# Patient Record
Sex: MALE | Race: BLACK OR AFRICAN AMERICAN | NOT HISPANIC OR LATINO
[De-identification: names, ages, dates, MRNs, and addresses within clinical notes are randomized per-mention and may not be internally consistent; named-entity substitution may affect disease eponyms.]

---
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
## 2023-07-07 ENCOUNTER — HOSPITAL ENCOUNTER (EMERGENCY)
Facility: HOSPITAL | Age: 23
Discharge: LEFT BEFORE DISCHARGE | End: 2023-07-07

## 2023-07-07 VITALS
TEMPERATURE: 98 F | OXYGEN SATURATION: 97 % | RESPIRATION RATE: 20 BRPM | SYSTOLIC BLOOD PRESSURE: 128 MMHG | WEIGHT: 215 LBS | BODY MASS INDEX: 31.84 KG/M2 | HEIGHT: 69 IN | DIASTOLIC BLOOD PRESSURE: 76 MMHG | HEART RATE: 60 BPM

## 2023-07-07 PROCEDURE — 93005 ELECTROCARDIOGRAM TRACING: CPT

## 2023-07-07 NOTE — ED INITIAL ASSESSMENT (HPI)
Pt c/o \"trouble breathing, trouble sleeping, up all day, feeling anxious an hr ago, saw an accident and it stressed me, feeling emotional, recent break up    \" Pt denies injury, denies involvement in accident. He denies SI/HI. Pt denies ETOH, admits regular cannabis use.  Pt c/o CP

## 2023-07-08 LAB
ATRIAL RATE: 49 BPM
P AXIS: -29 DEGREES
P-R INTERVAL: 170 MS
Q-T INTERVAL: 410 MS
QRS DURATION: 84 MS
QTC CALCULATION (BEZET): 370 MS
R AXIS: 14 DEGREES
T AXIS: 21 DEGREES
VENTRICULAR RATE: 49 BPM